# Patient Record
Sex: FEMALE | Race: OTHER | NOT HISPANIC OR LATINO | URBAN - METROPOLITAN AREA
[De-identification: names, ages, dates, MRNs, and addresses within clinical notes are randomized per-mention and may not be internally consistent; named-entity substitution may affect disease eponyms.]

---

## 2024-04-27 ENCOUNTER — HOSPITAL ENCOUNTER (OUTPATIENT)
Dept: RADIOLOGY | Facility: HOSPITAL | Age: 49
Discharge: HOME | End: 2024-04-27
Payer: COMMERCIAL

## 2024-04-27 ENCOUNTER — APPOINTMENT (OUTPATIENT)
Dept: LAB | Facility: HOSPITAL | Age: 49
End: 2024-04-27
Payer: COMMERCIAL

## 2024-04-27 DIAGNOSIS — E55.9 VITAMIN D DEFICIENCY: Primary | ICD-10-CM

## 2024-04-27 DIAGNOSIS — M25.512 ACUTE PAIN OF LEFT SHOULDER: ICD-10-CM

## 2024-04-27 LAB
25(OH)D3 SERPL-MCNC: 48.2 NG/ML (ref 30–100)
ALBUMIN SERPL BCP-MCNC: 4.4 G/DL (ref 3.5–5)
ALP SERPL-CCNC: 69 U/L (ref 34–104)
ALT SERPL W P-5'-P-CCNC: 9 U/L (ref 7–52)
ANION GAP SERPL CALCULATED.3IONS-SCNC: 7 MMOL/L (ref 4–13)
AST SERPL W P-5'-P-CCNC: 14 U/L (ref 13–39)
BASOPHILS # BLD AUTO: 0.02 THOUSANDS/ÂΜL (ref 0–0.1)
BASOPHILS NFR BLD AUTO: 0 % (ref 0–1)
BILIRUB SERPL-MCNC: 0.49 MG/DL (ref 0.2–1)
BUN SERPL-MCNC: 13 MG/DL (ref 5–25)
CALCIUM SERPL-MCNC: 9.9 MG/DL (ref 8.4–10.2)
CHLORIDE SERPL-SCNC: 107 MMOL/L (ref 96–108)
CHOLEST SERPL-MCNC: 166 MG/DL
CO2 SERPL-SCNC: 30 MMOL/L (ref 21–32)
CREAT SERPL-MCNC: 0.7 MG/DL (ref 0.6–1.3)
EOSINOPHIL # BLD AUTO: 0.08 THOUSAND/ÂΜL (ref 0–0.61)
EOSINOPHIL NFR BLD AUTO: 1 % (ref 0–6)
ERYTHROCYTE [DISTWIDTH] IN BLOOD BY AUTOMATED COUNT: 12.5 % (ref 11.6–15.1)
GFR SERPL CREATININE-BSD FRML MDRD: 102 ML/MIN/1.73SQ M
GLUCOSE P FAST SERPL-MCNC: 88 MG/DL (ref 65–99)
HCT VFR BLD AUTO: 41.6 % (ref 34.8–46.1)
HDLC SERPL-MCNC: 61 MG/DL
HGB BLD-MCNC: 13.5 G/DL (ref 11.5–15.4)
IMM GRANULOCYTES # BLD AUTO: 0.01 THOUSAND/UL (ref 0–0.2)
IMM GRANULOCYTES NFR BLD AUTO: 0 % (ref 0–2)
LDLC SERPL CALC-MCNC: 93 MG/DL (ref 0–100)
LYMPHOCYTES # BLD AUTO: 2.67 THOUSANDS/ÂΜL (ref 0.6–4.47)
LYMPHOCYTES NFR BLD AUTO: 36 % (ref 14–44)
MCH RBC QN AUTO: 30 PG (ref 26.8–34.3)
MCHC RBC AUTO-ENTMCNC: 32.5 G/DL (ref 31.4–37.4)
MCV RBC AUTO: 92 FL (ref 82–98)
MONOCYTES # BLD AUTO: 0.48 THOUSAND/ÂΜL (ref 0.17–1.22)
MONOCYTES NFR BLD AUTO: 6 % (ref 4–12)
NEUTROPHILS # BLD AUTO: 4.19 THOUSANDS/ÂΜL (ref 1.85–7.62)
NEUTS SEG NFR BLD AUTO: 57 % (ref 43–75)
NONHDLC SERPL-MCNC: 105 MG/DL
NRBC BLD AUTO-RTO: 0 /100 WBCS
PLATELET # BLD AUTO: 235 THOUSANDS/UL (ref 149–390)
PMV BLD AUTO: 10.5 FL (ref 8.9–12.7)
POTASSIUM SERPL-SCNC: 5 MMOL/L (ref 3.5–5.3)
PROT SERPL-MCNC: 7.3 G/DL (ref 6.4–8.4)
RBC # BLD AUTO: 4.5 MILLION/UL (ref 3.81–5.12)
SODIUM SERPL-SCNC: 144 MMOL/L (ref 135–147)
TRIGL SERPL-MCNC: 61 MG/DL
TSH SERPL DL<=0.05 MIU/L-ACNC: 2.29 UIU/ML (ref 0.45–4.5)
WBC # BLD AUTO: 7.45 THOUSAND/UL (ref 4.31–10.16)

## 2024-04-27 PROCEDURE — 84443 ASSAY THYROID STIM HORMONE: CPT

## 2024-04-27 PROCEDURE — 36415 COLL VENOUS BLD VENIPUNCTURE: CPT

## 2024-04-27 PROCEDURE — 82306 VITAMIN D 25 HYDROXY: CPT

## 2024-04-27 PROCEDURE — 80061 LIPID PANEL: CPT

## 2024-04-27 PROCEDURE — 85025 COMPLETE CBC W/AUTO DIFF WBC: CPT

## 2024-04-27 PROCEDURE — 80053 COMPREHEN METABOLIC PANEL: CPT

## 2024-05-10 ENCOUNTER — HOSPITAL ENCOUNTER (OUTPATIENT)
Dept: BONE DENSITY | Facility: CLINIC | Age: 49
End: 2024-05-10
Payer: COMMERCIAL

## 2024-05-10 DIAGNOSIS — Z78.0 POSTMENOPAUSE: ICD-10-CM

## 2024-05-10 PROCEDURE — 77080 DXA BONE DENSITY AXIAL: CPT

## 2024-07-12 ENCOUNTER — CONSULT (OUTPATIENT)
Dept: ENDOCRINOLOGY | Facility: CLINIC | Age: 49
End: 2024-07-12
Payer: COMMERCIAL

## 2024-07-12 VITALS
HEIGHT: 66 IN | SYSTOLIC BLOOD PRESSURE: 110 MMHG | WEIGHT: 139.6 LBS | DIASTOLIC BLOOD PRESSURE: 74 MMHG | HEART RATE: 58 BPM | BODY MASS INDEX: 22.43 KG/M2

## 2024-07-12 DIAGNOSIS — M81.0 OSTEOPOROSIS, UNSPECIFIED OSTEOPOROSIS TYPE, UNSPECIFIED PATHOLOGICAL FRACTURE PRESENCE: ICD-10-CM

## 2024-07-12 PROCEDURE — 99204 OFFICE O/P NEW MOD 45 MIN: CPT | Performed by: INTERNAL MEDICINE

## 2024-07-12 NOTE — PROGRESS NOTES
7/12/2024    Assessment & Plan      Diagnoses and all orders for this visit:    Osteoporosis, unspecified osteoporosis type, unspecified pathological fracture presence  -     Ambulatory Referral to Endocrinology  -     Comprehensive metabolic panel  -     PTH, intact  -     Vitamin D 25 hydroxy  -     TSH, 3rd generation  -     T4, free  -     CBC and differential  -     Phosphorus        Assessment/Plan:  Osteoporosis: Reviewed we will need to obtain secondary workup as ordered above.  Reviewed rationale for considering treatment in the form of weightbearing exercise, calcium and vitamin D supplementation as well as medications.  Consider starting Reclast once secondary workup is back.  Provided information for the patient to review regarding osteoporosis medications as well as calcium containing foods and weightbearing exercise.  Will arrange for follow-up in 6 months but be in touch as labs are available.      CC: Osteoporosis consult    History of Present Illness     HPI: Garrett Andre is a 48 y.o. year old female who presents for consultation regarding osteoporosis seen on recent DEXA scan.  The patient's son-in-law is available to help with translation.  It looks like DEXA scan was done in the setting of menopause based on diagnosis of over 1 year since last menstrual cycle.  She does endorse her mother had osteoporosis but her mother did receive cortisone frequently.  She is currently receiving vitamin D supplementation.  She does not take calcium supplementation but does receive calcium through diet.  No history of falls or fractures.  No history of kidney stones.  No known family history of thyroid or parathyroid or other endocrine neoplasia.  No corticosteroids in the patient.  No PPIs.  She does have regular dental follow-up and has some cavities that need to be addressed but no invasive dental procedures that would impact her bones.    Review of Systems   Constitutional:  Negative for fatigue.   HENT:   "Negative for trouble swallowing and voice change.    Eyes:  Negative for visual disturbance.   Respiratory:  Negative for shortness of breath.    Cardiovascular:  Negative for palpitations and leg swelling.   Gastrointestinal:  Negative for abdominal pain, nausea and vomiting.   Endocrine: Negative for polydipsia and polyuria.   Musculoskeletal:  Negative for arthralgias and myalgias.   Skin:  Negative for rash.   Neurological:  Negative for dizziness, tremors and weakness.   Hematological:  Negative for adenopathy.   Psychiatric/Behavioral:  Negative for agitation and confusion.        Historical Information   History reviewed. No pertinent past medical history.  History reviewed. No pertinent surgical history.  Social History   Social History     Substance and Sexual Activity   Alcohol Use Not Currently     Social History     Substance and Sexual Activity   Drug Use Not Currently     Social History     Tobacco Use   Smoking Status Every Day    Current packs/day: 0.25    Types: Cigarettes   Smokeless Tobacco Current     Family History:   Family History   Problem Relation Age of Onset    No Known Problems Mother     No Known Problems Father        Meds/Allergies   Current Outpatient Medications   Medication Sig Dispense Refill    Cholecalciferol (Vitamin D) 50 MCG (2000 UT) tablet Take one tablet daily 90 tablet 3    fluticasone (FLONASE) 50 mcg/act nasal spray 1 spray into each nostril daily 9.9 mL 4    loratadine-pseudoephedrine (CLARITIN-D 24-HOUR)  mg per 24 hr tablet Take 1 tablet by mouth daily 10 tablet 0    naproxen (NAPROSYN) 500 mg tablet TAKE 1 TABLET BY MOUTH TWICE A DAY WITH MEALS 30 tablet 0    solifenacin (VESICARE) 10 MG tablet TAKE 1 TABLET BY MOUTH EVERY DAY 90 tablet 5     No current facility-administered medications for this visit.     No Known Allergies    Objective   Vitals: Blood pressure 110/74, pulse 58, height 5' 5.91\" (1.674 m), weight 63.3 kg (139 lb 9.6 oz).  Invasive Devices       " None                   Physical Exam  Vitals reviewed.   Constitutional:       General: She is not in acute distress.     Appearance: She is well-developed. She is not diaphoretic.   HENT:      Head: Normocephalic and atraumatic.   Eyes:      Conjunctiva/sclera: Conjunctivae normal.      Pupils: Pupils are equal, round, and reactive to light.   Neck:      Thyroid: No thyromegaly.   Cardiovascular:      Rate and Rhythm: Normal rate and regular rhythm.   Pulmonary:      Effort: Pulmonary effort is normal. No respiratory distress.      Breath sounds: Normal breath sounds.   Abdominal:      General: Bowel sounds are normal.      Palpations: Abdomen is soft.   Musculoskeletal:         General: Normal range of motion.      Cervical back: Normal range of motion and neck supple.   Skin:     General: Skin is warm and dry.      Findings: No rash.   Neurological:      Mental Status: She is alert and oriented to person, place, and time.      Motor: No abnormal muscle tone.   Psychiatric:         Behavior: Behavior normal.         The history was obtained from the review of the chart and from the patient and family.    Lab Results:      Component      Latest Ref [facility] 4/27/2024   WBC      4.31 - 10.16 Thousand/uL 7.45    RBC      3.81 - 5.12 Million/uL 4.50    Hemoglobin      11.5 - 15.4 g/dL 13.5    Hematocrit      34.8 - 46.1 % 41.6    MCV      82 - 98 fL 92    MCH      26.8 - 34.3 pg 30.0    MCHC      31.4 - 37.4 g/dL 32.5    RDW      11.6 - 15.1 % 12.5    MPV      8.9 - 12.7 fL 10.5    Platelet Count      149 - 390 Thousands/uL 235    nRBC      /100 WBCs 0    Segmented %      43 - 75 % 57    Immature Grans %      0 - 2 % 0    Lymphocytes %      14 - 44 % 36    Monocytes %      4 - 12 % 6    Eosinophils %      0 - 6 % 1    Basophils %      0 - 1 % 0    Absolute Neutrophils      1.85 - 7.62 Thousands/µL 4.19    Absolute Immature Grans      0.00 - 0.20 Thousand/uL 0.01    Absolute Lymphocytes      0.60 - 4.47 Thousands/µL 2.67     Absolute Monocytes      0.17 - 1.22 Thousand/µL 0.48    Absolute Eosinophils      0.00 - 0.61 Thousand/µL 0.08    Absolute Basophils      0.00 - 0.10 Thousands/µL 0.02    Sodium      135 - 147 mmol/L 144    Potassium      3.5 - 5.3 mmol/L 5.0    Chloride      96 - 108 mmol/L 107    Carbon Dioxide      21 - 32 mmol/L 30    ANION GAP      4 - 13 mmol/L 7    BUN      5 - 25 mg/dL 13    Creatinine      0.60 - 1.30 mg/dL 0.70    GLUCOSE, FASTING      65 - 99 mg/dL 88    Calcium      8.4 - 10.2 mg/dL 9.9    AST      13 - 39 U/L 14    ALT      7 - 52 U/L 9    ALK PHOS      34 - 104 U/L 69    Total Protein      6.4 - 8.4 g/dL 7.3    Albumin      3.5 - 5.0 g/dL 4.4    Total Bilirubin      0.20 - 1.00 mg/dL 0.49    GFR, Calculated      ml/min/1.73sq m 102    Cholesterol      See Comment mg/dL 166    Triglycerides      See Comment mg/dL 61    HDL      >=50 mg/dL 61    LDL Calculated      0 - 100 mg/dL 93    Non-HDL Cholesterol      mg/dl 105    TSH 3RD GENERATON      0.450 - 4.500 uIU/mL 2.289    VITAMIN D, 25-HYDROXY      30.0 - 100.0 ng/mL 48.2        5/10/24:  DXA SCAN     CLINICAL HISTORY: 48 years postmenopausal female.  OTHER RISK FACTORS: Tobacco use.     PHARMACOLOGIC THERAPY FOR OSTEOPOROSIS:  None.     TECHNIQUE: Bone densitometry was performed using a bone densitometer.  Regions of interest appear properly placed.     COMPARISON: There are no prior DXA studies performed on this unit for comparison.     RESULTS:     LUMBAR SPINE  Level: L1-L4 :  BMD: 0.962 gm/cm2  T-score: -0.8        LEFT  TOTAL HIP:  BMD: 0.674 gm/cm2  T-score: -2.2     LEFT  FEMORAL NECK:  BMD: 0.513 gm/cm2  T score: -3.0        IMPRESSION:     1. Osteoporosis. Based on the left femoral neck     2.  The 10 year risk of hip fracture is 4.2% with the 10 year risk of major osteoporotic fracture being 8.8% as calculated by the [employer] fracture risk assessment tool (FRAX, which is based on data generated by the WHO Collaborating    [place] for Metabolic Bone Diseases).     3.  The current NOF guidelines recommend treating patients with a T-score of -2.5 or less in the lumbar spine or hips, or in post-menopausal women and men over the age of 50 with low bone mass (osteopenia) and a FRAX 10 year risk score of >3% for hip   fracture and/or >20% for major osteoporotic fracture.     4.  The NOF recommends follow-up DXA in 1-2 years after initiating therapy for osteoporosis and every 2 years thereafter. More frequent evaluation is appropriate for patients with conditions associated with rapid bone loss, such as glucocorticoid   therapy. The interval between DXA screenings may be longer for individuals without major risk factors and initial T-score in the normal or upper low bone mass range.        The FRAX algorithm has certain limitations:  -FRAX has not been validated in patients currently or previously treated with pharmacotherapy for osteoporosis.  In such patients, clinical judgment must be exercised in interpreting FRAX scores.  -Prior hip, vertebral and humeral fragility fractures appear to confer greater risk of subsequent fracture than fractures at other sites (this is especially true for individuals with severe vertebral fractures), but quantification of this incremental   risk is not possible with FRAX.  -FRAX underestimates fracture risk in patients with history of multiple fragility fractures.  -FRAX may underestimate fracture risk in patients with history of frequent falls.  -It is not appropriate to use FRAX to monitor treatment response.        WHO CLASSIFICATION:  Normal (a T-score of -1.0 or higher)  Low bone mineral density (a T-score of less than -1.0 but higher than -2.5)  Osteoporosis (a T-score of -2.5 or less)  Severe osteoporosis (a T-score of -2.5 or less with a fragility fracture)          No future appointments.      Portions of the record may have been created with voice recognition software. Occasional wrong word or  "\"sound a like\" substitutions may have occurred due to the inherent limitations of voice recognition software. Read the chart carefully and recognize, using context, where substitutions have occurred.    "

## 2024-07-13 LAB
25(OH)D3+25(OH)D2 SERPL-MCNC: 39 NG/ML (ref 30–100)
ALBUMIN SERPL-MCNC: 4 G/DL (ref 3.5–5.7)
ALP SERPL-CCNC: 65 U/L (ref 35–120)
ALT SERPL-CCNC: 11 U/L
ANION GAP SERPL CALCULATED.3IONS-SCNC: 9 MMOL/L (ref 3–11)
AST SERPL-CCNC: 16 U/L
BASOPHILS # BLD AUTO: 0 THOU/CMM (ref 0–0.1)
BASOPHILS NFR BLD AUTO: 0 %
BILIRUB SERPL-MCNC: 0.5 MG/DL (ref 0.2–1)
BUN SERPL-MCNC: 15 MG/DL (ref 7–25)
CALCIUM SERPL-MCNC: 8.9 MG/DL (ref 8.5–10.1)
CHLORIDE SERPL-SCNC: 98 MMOL/L (ref 100–109)
CO2 SERPL-SCNC: 27 MMOL/L (ref 21–31)
CREAT SERPL-MCNC: 0.63 MG/DL (ref 0.4–1.1)
CYTOLOGY CMNT CVX/VAG CYTO-IMP: ABNORMAL
DIFFERENTIAL METHOD BLD: NORMAL
EOSINOPHIL # BLD AUTO: 0.1 THOU/CMM (ref 0–0.5)
EOSINOPHIL NFR BLD AUTO: 2 %
ERYTHROCYTE [DISTWIDTH] IN BLOOD BY AUTOMATED COUNT: 12.4 % (ref 12–16)
GFR/BSA.PRED SERPLBLD CYS-BASED-ARV: 108 ML/MIN/{1.73_M2}
GLUCOSE SERPL-MCNC: 87 MG/DL (ref 65–99)
HCT VFR BLD AUTO: 37.4 % (ref 35–43)
HGB BLD-MCNC: 12.4 G/DL (ref 11.5–14.5)
LYMPHOCYTES # BLD AUTO: 2.9 THOU/CMM (ref 1–3)
LYMPHOCYTES NFR BLD AUTO: 44 %
MCH RBC QN AUTO: 30 PG (ref 26–34)
MCHC RBC AUTO-ENTMCNC: 33.2 G/DL (ref 32–37)
MCV RBC AUTO: 90 FL (ref 80–100)
MONOCYTES # BLD AUTO: 0.4 THOU/CMM (ref 0.3–1)
MONOCYTES NFR BLD AUTO: 6 %
NEUTROPHILS # BLD AUTO: 3.1 THOU/CMM (ref 1.8–7.8)
NEUTROPHILS NFR BLD AUTO: 48 %
PHOSPHATE SERPL-MCNC: 3.8 MG/DL (ref 2.3–4.6)
PLATELET # BLD AUTO: 161 THOU/CMM (ref 140–350)
PMV BLD REES-ECKER: 8.6 FL (ref 7.5–11.3)
POTASSIUM SERPL-SCNC: 4.1 MMOL/L (ref 3.5–5.2)
PROT SERPL-MCNC: 6.4 G/DL (ref 6.3–8.3)
PTH-INTACT SERPL-MCNC: 55.2 PG/ML (ref 12–88)
RBC # BLD AUTO: 4.14 MILL/CMM (ref 3.7–4.7)
SODIUM SERPL-SCNC: 134 MMOL/L (ref 135–145)
T4 FREE SERPL-MCNC: 0.93 NG/DL (ref 0.61–1.12)
TSH SERPL-ACNC: 2.91 UIU/ML (ref 0.45–5.33)
WBC # BLD AUTO: 6.6 THOU/CMM (ref 4–10)

## 2024-07-17 ENCOUNTER — TREATMENT (OUTPATIENT)
Dept: OTHER | Facility: HOSPITAL | Age: 49
End: 2024-07-17

## 2024-07-17 DIAGNOSIS — M81.8 OTHER OSTEOPOROSIS WITHOUT CURRENT PATHOLOGICAL FRACTURE: Primary | ICD-10-CM

## 2024-07-17 RX ORDER — SODIUM CHLORIDE 9 MG/ML
20 INJECTION, SOLUTION INTRAVENOUS ONCE
OUTPATIENT
Start: 2024-07-25

## 2024-07-17 RX ORDER — ZOLEDRONIC ACID 5 MG/100ML
5 INJECTION, SOLUTION INTRAVENOUS ONCE
OUTPATIENT
Start: 2024-07-25

## 2025-01-15 ENCOUNTER — TELEPHONE (OUTPATIENT)
Age: 50
End: 2025-01-15

## 2025-01-15 NOTE — TELEPHONE ENCOUNTER
"Michelle from PCP office returning call.     States \"We always have trouble with this insurance because they use this site [facility] and [facility] can not be found on their website and I have put in numerous tickets and nothing is resolved.  Please have someone call back so we can try to figure this out if I need to put in something else or try something different.\"     Please call [phone] and ask for Michelle  "

## 2025-01-17 ENCOUNTER — OFFICE VISIT (OUTPATIENT)
Dept: ENDOCRINOLOGY | Facility: CLINIC | Age: 50
End: 2025-01-17
Payer: COMMERCIAL

## 2025-01-17 ENCOUNTER — APPOINTMENT (OUTPATIENT)
Dept: LAB | Facility: CLINIC | Age: 50
End: 2025-01-17
Payer: COMMERCIAL

## 2025-01-17 VITALS
SYSTOLIC BLOOD PRESSURE: 120 MMHG | HEIGHT: 66 IN | WEIGHT: 149.4 LBS | DIASTOLIC BLOOD PRESSURE: 70 MMHG | HEART RATE: 61 BPM | BODY MASS INDEX: 24.01 KG/M2 | OXYGEN SATURATION: 94 %

## 2025-01-17 DIAGNOSIS — M81.8 OTHER OSTEOPOROSIS WITHOUT CURRENT PATHOLOGICAL FRACTURE: Primary | ICD-10-CM

## 2025-01-17 DIAGNOSIS — M81.8 OTHER OSTEOPOROSIS WITHOUT CURRENT PATHOLOGICAL FRACTURE: ICD-10-CM

## 2025-01-17 LAB
25(OH)D3 SERPL-MCNC: 46.9 NG/ML (ref 30–100)
ALBUMIN SERPL BCG-MCNC: 4.6 G/DL (ref 3.5–5)
ALP SERPL-CCNC: 71 U/L (ref 34–104)
ALT SERPL W P-5'-P-CCNC: 12 U/L (ref 7–52)
ANION GAP SERPL CALCULATED.3IONS-SCNC: 7 MMOL/L (ref 4–13)
AST SERPL W P-5'-P-CCNC: 16 U/L (ref 13–39)
BILIRUB SERPL-MCNC: 0.35 MG/DL (ref 0.2–1)
BUN SERPL-MCNC: 16 MG/DL (ref 5–25)
CALCIUM SERPL-MCNC: 9.6 MG/DL (ref 8.4–10.2)
CHLORIDE SERPL-SCNC: 103 MMOL/L (ref 96–108)
CO2 SERPL-SCNC: 28 MMOL/L (ref 21–32)
CREAT SERPL-MCNC: 0.65 MG/DL (ref 0.6–1.3)
GFR SERPL CREATININE-BSD FRML MDRD: 103 ML/MIN/1.73SQ M
GLUCOSE SERPL-MCNC: 83 MG/DL (ref 65–140)
POTASSIUM SERPL-SCNC: 4.1 MMOL/L (ref 3.5–5.3)
PROT SERPL-MCNC: 7.7 G/DL (ref 6.4–8.4)
SODIUM SERPL-SCNC: 138 MMOL/L (ref 135–147)

## 2025-01-17 PROCEDURE — 82306 VITAMIN D 25 HYDROXY: CPT

## 2025-01-17 PROCEDURE — 99214 OFFICE O/P EST MOD 30 MIN: CPT

## 2025-01-17 PROCEDURE — 80053 COMPREHEN METABOLIC PANEL: CPT

## 2025-01-17 PROCEDURE — 36415 COLL VENOUS BLD VENIPUNCTURE: CPT

## 2025-01-17 RX ORDER — CALCIUM CARBONATE 500 MG/1
1 TABLET, CHEWABLE ORAL DAILY
Qty: 90 TABLET | Refills: 1 | Status: SHIPPED | OUTPATIENT
Start: 2025-01-17

## 2025-01-17 NOTE — PROGRESS NOTES
Established Patient Progress Note    CC: Follow up for osteoporosis     Impression & Plan:    Assessment & Plan  Other osteoporosis without current pathological fracture  Needs to schedule IV Reclast. This will be done today. Side effects reviewed. She may take a tylenol to help with flu-like symptoms post infusion.  Counseled on fall prevention, weight bearing/resistance exercises, continue calcium and vitamin D supplement  Advise she wait 2-3 months after infusion to get dental implant  List of calcium rich foods provided. Requests Rx for calcium supplement. Will send for 500 mg calcium carbonate daily    Due for lab work      Orders:    Comprehensive metabolic panel; Future    calcium carbonate (TUMS) 500 mg chewable tablet; Chew 1 tablet (500 mg total) daily      Orders Placed This Encounter   Procedures    Comprehensive metabolic panel     This is a patient instruction: Patient fasting for 8 hours or longer recommended.     Standing Status:   Future     Number of Occurrences:   1     Expected Date:   7/17/2025     Expiration Date:   1/17/2026       : [id number]    History of Present Illness:   Garrett Andre is a 49 y.o. female with a history of osteoporosis seen in consult 07/2024 by Dr. Cope. Secondary work up completed. IV reclast prescribed.    DEXA 05/2024- T score: -3.0; FRAX hip 4.2%    +family history of osteoporosis in mother  UTD on dental exams  No PPI use    Needs dental implant    Vitamin D- 2,000 IU daily  Calcium- not on supplement. Does not believe she meets dietary requirements    She is getting first infusion of IV Reclast today.     Patient Active Problem List   Diagnosis    Other osteoporosis without current pathological fracture      History reviewed. No pertinent past medical history.   History reviewed. No pertinent surgical history.   Family History   Problem Relation Age of Onset    No Known Problems Mother     No Known Problems Father      Social History     Tobacco Use     "Smoking status: Every Day     Current packs/day: 0.25     Types: Cigarettes    Smokeless tobacco: Current   Substance Use Topics    Alcohol use: Not Currently     No Known Allergies      Current Outpatient Medications:     calcium carbonate (TUMS) 500 mg chewable tablet, Chew 1 tablet (500 mg total) daily, Disp: 90 tablet, Rfl: 1    Cholecalciferol (Vitamin D) 50 MCG (2000 UT) tablet, Take one tablet daily, Disp: 90 tablet, Rfl: 3    fluticasone (FLONASE) 50 mcg/act nasal spray, 1 spray into each nostril daily (Patient not taking: Reported on 1/17/2025), Disp: 9.9 mL, Rfl: 4    loratadine-pseudoephedrine (CLARITIN-D 24-HOUR)  mg per 24 hr tablet, Take 1 tablet by mouth daily (Patient not taking: Reported on 1/17/2025), Disp: 10 tablet, Rfl: 0    naproxen (NAPROSYN) 500 mg tablet, TAKE 1 TABLET BY MOUTH TWICE A DAY WITH MEALS (Patient not taking: Reported on 1/17/2025), Disp: 30 tablet, Rfl: 0    solifenacin (VESICARE) 10 MG tablet, TAKE 1 TABLET BY MOUTH EVERY DAY (Patient not taking: Reported on 1/17/2025), Disp: 90 tablet, Rfl: 5    Review of Systems   Constitutional:  Negative for chills and fever.   HENT:  Negative for ear pain and sore throat.    Eyes:  Negative for pain and visual disturbance.   Respiratory:  Negative for cough and shortness of breath.    Cardiovascular:  Negative for chest pain and palpitations.   Gastrointestinal:  Negative for abdominal pain and vomiting.   Genitourinary:  Negative for dysuria and hematuria.   Musculoskeletal:  Negative for arthralgias and back pain.   Skin:  Negative for color change and rash.   Neurological:  Negative for seizures and syncope.   All other systems reviewed and are negative.      Physical Exam:  Body mass index is 24.18 kg/m².  /70   Pulse 61   Ht 5' 5.91\" (1.674 m)   Wt 67.8 kg (149 lb 6.4 oz)   SpO2 94%   BMI 24.18 kg/m²    Wt Readings from Last 3 Encounters:   01/17/25 67.8 kg (149 lb 6.4 oz)   07/12/24 63.3 kg (139 lb 9.6 oz)   03/21/24 " "59 kg (130 lb)       Physical Exam  Vitals reviewed.   Constitutional:       Appearance: Normal appearance.   HENT:      Head: Normocephalic and atraumatic.   Cardiovascular:      Rate and Rhythm: Normal rate.   Pulmonary:      Effort: Pulmonary effort is normal.   Neurological:      Mental Status: She is alert and oriented to person, place, and time.   Psychiatric:         Mood and Affect: Mood normal.         Behavior: Behavior normal.         Labs:   No results found for: \"HGBA1C\"  Lab Results   Component Value Date    CREATININE 0.63 07/13/2024    CREATININE 0.70 04/27/2024    CREATININE 0.52 (L) 04/05/2023    BUN 15 07/13/2024    K 4.1 07/13/2024    CL 98 (L) 07/13/2024    CO2 27 07/13/2024     eGFR   Date Value Ref Range Status   07/13/2024 108 >59 Final   04/27/2024 102 ml/min/1.73sq m Final     Lab Results   Component Value Date    HDL 61 04/27/2024    TRIG 61 04/27/2024    CHOLHDL 3.1 04/05/2023     Lab Results   Component Value Date    ALT 11 07/13/2024    AST 16 07/13/2024    ALKPHOS 65 07/13/2024     Lab Results   Component Value Date    TUM3XXNHCEOJ 2.289 04/27/2024     Lab Results   Component Value Date    FREET4 0.93 07/13/2024         Patient Instructions   Tylenol- over the counter for flu-like symptoms post infusion    1200 mg calcium daily    A Guide to Calcium-Rich Foods  We all know that milk is a great source of calcium, but you may be surprised by all the different foods you can work into your diet to reach your daily recommended amount of calcium. Use the guide below to get ideas of additional calcium-rich foods to add to your weekly shopping list.  Produce Serving Size Estimated Calcium*   Wolf greens, frozen 8 oz 360 mg   Broccoli debra 8 oz 200 mg   Kale, frozen 8 oz 180 mg   Soy Beans, green, boiled 8 oz 175 mg   Bok [place]ila, cooked, boiled 8 oz 160 mg   Figs, dried 2 figs 65 mg   Broccoli, fresh, cooked 8 oz 60 mg   Oranges 1 whole 55 mg   Seafood Serving Size Estimated Calcium* "   Sardines, canned with bones 3 oz 325 mg   [place], canned with bones 3 oz 180 mg   Shrimp, canned 3 oz 125 mg   Dairy Serving Size Estimated Calcium*   Ricotta, part-skim 4 oz 335 mg   Yogurt, plain, low-fat 6 oz 310 mg   Milk, skim, low-fat, whole 8 oz 300 mg   Yogurt with fruit, low-fat 6 oz 260 mg   Mozzarella, part-skim 1 oz 210 mg   Cheddar 1 oz 205 mg   Yogurt, Greek 6 oz 200 mg   American Cheese 1 oz 195 mg   Feta Cheese 4 oz 140 mg   Cottage Cheese, 2% 4 oz 105 mg   Frozen yogurt, vanilla 8 oz 105 mg   Ice Cream, vanilla 8 oz 85 mg   Parmesan 1 tbsp 55 mg   Fortified Food Serving Size Estimated Calcium*   [place] milk, rice milk or soy milk, fortified 8 oz 300 mg   Orange juice and other fruit juices, fortified 8 oz 300 mg   Tofu, prepared with calcium 4 oz 205 mg   Waffle, frozen, fortified 2 pieces 200 mg   Oatmeal, fortified 1 packet 140 mg   English muffin, fortified 1 muffin 100 mg   Cereal, fortified 8 oz 100-1,000 mg   Other Serving Size Estimated Calcium*   Mac & cheese, frozen 1 package 325 mg   Pizza, cheese, frozen 1 serving 115 mg   Pudding, chocolate, prepared with 2% milk 4 oz 160 mg   Beans, baked, canned 4 oz 160 mg   *The calcium content listed for most foods is estimated and can vary due to multiple factors. Check the food label to determine how much calcium is in a particular product.        Discussed with the patient and all questioned fully answered. She will call me if any problems arise.

## 2025-01-17 NOTE — ASSESSMENT & PLAN NOTE
Needs to schedule IV Reclast. This will be done today. Side effects reviewed. She may take a tylenol to help with flu-like symptoms post infusion.  Counseled on fall prevention, weight bearing/resistance exercises, continue calcium and vitamin D supplement  Advise she wait 2-3 months after infusion to get dental implant  List of calcium rich foods provided. Requests Rx for calcium supplement. Will send for 500 mg calcium carbonate daily    Due for lab work      Orders:    Comprehensive metabolic panel; Future    calcium carbonate (TUMS) 500 mg chewable tablet; Chew 1 tablet (500 mg total) daily

## 2025-01-17 NOTE — PATIENT INSTRUCTIONS
Tylenol- over the counter for flu-like symptoms post infusion    1200 mg calcium daily    A Guide to Calcium-Rich Foods  We all know that milk is a great source of calcium, but you may be surprised by all the different foods you can work into your diet to reach your daily recommended amount of calcium. Use the guide below to get ideas of additional calcium-rich foods to add to your weekly shopping list.  Produce Serving Size Estimated Calcium*   Wolf greens, frozen 8 oz 360 mg   Broccoli debra 8 oz 200 mg   Kale, frozen 8 oz 180 mg   Soy Beans, green, boiled 8 oz 175 mg   Bok Kamila, cooked, boiled 8 oz 160 mg   Figs, dried 2 figs 65 mg   Broccoli, fresh, cooked 8 oz 60 mg   Oranges 1 whole 55 mg   Seafood Serving Size Estimated Calcium*   Sardines, canned with bones 3 oz 325 mg   [place], canned with bones 3 oz 180 mg   Shrimp, canned 3 oz 125 mg   Dairy Serving Size Estimated Calcium*   Ricotta, part-skim 4 oz 335 mg   Yogurt, plain, low-fat 6 oz 310 mg   Milk, skim, low-fat, whole 8 oz 300 mg   Yogurt with fruit, low-fat 6 oz 260 mg   Mozzarella, part-skim 1 oz 210 mg   Cheddar 1 oz 205 mg   Yogurt, Greek 6 oz 200 mg   American Cheese 1 oz 195 mg   Feta Cheese 4 oz 140 mg   Cottage Cheese, 2% 4 oz 105 mg   Frozen yogurt, vanilla 8 oz 105 mg   Ice Cream, vanilla 8 oz 85 mg   Parmesan 1 tbsp 55 mg   Fortified Food Serving Size Estimated Calcium*   [place] milk, rice milk or soy milk, fortified 8 oz 300 mg   Orange juice and other fruit juices, fortified 8 oz 300 mg   Tofu, prepared with calcium 4 oz 205 mg   Waffle, frozen, fortified 2 pieces 200 mg   Oatmeal, fortified 1 packet 140 mg   English muffin, fortified 1 muffin 100 mg   Cereal, fortified 8 oz 100-1,000 mg   Other Serving Size Estimated Calcium*   Mac & cheese, frozen 1 package 325 mg   Pizza, cheese, frozen 1 serving 115 mg   Pudding, chocolate, prepared with 2% milk 4 oz 160 mg   Beans, baked, canned 4 oz 160 mg   *The calcium content listed for most  foods is estimated and can vary due to multiple factors. Check the food label to determine how much calcium is in a particular product.

## 2025-01-20 ENCOUNTER — RESULTS FOLLOW-UP (OUTPATIENT)
Dept: ENDOCRINOLOGY | Facility: CLINIC | Age: 50
End: 2025-01-20

## 2025-01-24 ENCOUNTER — TELEPHONE (OUTPATIENT)
Dept: ENDOCRINOLOGY | Facility: CLINIC | Age: 50
End: 2025-01-24

## 2025-02-07 ENCOUNTER — TELEPHONE (OUTPATIENT)
Dept: ENDOCRINOLOGY | Facility: CLINIC | Age: 50
End: 2025-02-07

## 2025-02-07 NOTE — TELEPHONE ENCOUNTER
Left a message at doctors office to give us a call about the insurance referral that they could not get in [facility]

## 2025-02-07 NOTE — TELEPHONE ENCOUNTER
----- Message from Angel COLE sent at 1/22/2025 10:35 AM EST -----  Please contact PCP office to obtain referral.  ----- Message -----  From: Odessa Piña MA  Sent: 1/21/2025   9:19 AM EST  To: Angel GUILLEN  ----- Message -----  From: Nicolle Mcneil  Sent: 1/16/2025  11:21 AM EST  To: #    Called pts pcp office and spoke with Michelle. She stated she put in another ticket with [facility] so she can put in pts referral once fixed but they still have not gotten back to her. Will contact office to reschedule patients apt and poss have pt contact ins or pcp to see if there is anything patient can do. When I spoke with the insurance they stated they will not create insurance referrals on the phone. Has to be done in [facility].   Please reschedule patient.  Thank you

## 2025-07-11 ENCOUNTER — HOSPITAL ENCOUNTER (OUTPATIENT)
Dept: MAMMOGRAPHY | Facility: MEDICAL CENTER | Age: 50
Discharge: HOME | End: 2025-07-11
Attending: OBSTETRICS & GYNECOLOGY
Payer: COMMERCIAL

## 2025-07-11 VITALS — HEIGHT: 66 IN | BODY MASS INDEX: 23.95 KG/M2 | WEIGHT: 149 LBS

## 2025-07-11 DIAGNOSIS — Z12.31 ENCOUNTER FOR SCREENING MAMMOGRAM FOR MALIGNANT NEOPLASM OF BREAST: ICD-10-CM

## 2025-07-11 PROCEDURE — 77067 SCR MAMMO BI INCL CAD: CPT

## 2025-07-11 PROCEDURE — 77063 BREAST TOMOSYNTHESIS BI: CPT
